# Patient Record
Sex: MALE
[De-identification: names, ages, dates, MRNs, and addresses within clinical notes are randomized per-mention and may not be internally consistent; named-entity substitution may affect disease eponyms.]

---

## 2021-10-04 ENCOUNTER — APPOINTMENT (OUTPATIENT)
Dept: INTERNAL MEDICINE | Facility: CLINIC | Age: 51
End: 2021-10-04

## 2021-12-18 ENCOUNTER — NON-APPOINTMENT (OUTPATIENT)
Age: 51
End: 2021-12-18

## 2022-08-08 ENCOUNTER — LABORATORY RESULT (OUTPATIENT)
Age: 52
End: 2022-08-08

## 2022-08-08 ENCOUNTER — NON-APPOINTMENT (OUTPATIENT)
Age: 52
End: 2022-08-08

## 2022-08-08 ENCOUNTER — APPOINTMENT (OUTPATIENT)
Dept: INTERNAL MEDICINE | Facility: CLINIC | Age: 52
End: 2022-08-08

## 2022-08-08 VITALS
TEMPERATURE: 97.8 F | SYSTOLIC BLOOD PRESSURE: 128 MMHG | WEIGHT: 149 LBS | RESPIRATION RATE: 16 BRPM | HEIGHT: 67 IN | BODY MASS INDEX: 23.39 KG/M2 | HEART RATE: 70 BPM | DIASTOLIC BLOOD PRESSURE: 70 MMHG | OXYGEN SATURATION: 98 %

## 2022-08-08 DIAGNOSIS — Z78.9 OTHER SPECIFIED HEALTH STATUS: ICD-10-CM

## 2022-08-08 DIAGNOSIS — N40.0 BENIGN PROSTATIC HYPERPLASIA WITHOUT LOWER URINARY TRACT SYMPMS: ICD-10-CM

## 2022-08-08 DIAGNOSIS — J34.2 DEVIATED NASAL SEPTUM: ICD-10-CM

## 2022-08-08 DIAGNOSIS — Z83.3 FAMILY HISTORY OF DIABETES MELLITUS: ICD-10-CM

## 2022-08-08 DIAGNOSIS — Z23 ENCOUNTER FOR IMMUNIZATION: ICD-10-CM

## 2022-08-08 DIAGNOSIS — J30.9 ALLERGIC RHINITIS, UNSPECIFIED: ICD-10-CM

## 2022-08-08 DIAGNOSIS — Z00.00 ENCOUNTER FOR GENERAL ADULT MEDICAL EXAMINATION W/OUT ABNORMAL FINDINGS: ICD-10-CM

## 2022-08-08 DIAGNOSIS — Z12.11 ENCOUNTER FOR SCREENING FOR MALIGNANT NEOPLASM OF COLON: ICD-10-CM

## 2022-08-08 LAB
DATE COLLECTED: NORMAL
HEMOCCULT SP1 STL QL: NEGATIVE

## 2022-08-08 PROCEDURE — 93000 ELECTROCARDIOGRAM COMPLETE: CPT

## 2022-08-08 PROCEDURE — 90715 TDAP VACCINE 7 YRS/> IM: CPT

## 2022-08-08 PROCEDURE — 99386 PREV VISIT NEW AGE 40-64: CPT | Mod: 25

## 2022-08-08 PROCEDURE — 90471 IMMUNIZATION ADMIN: CPT

## 2022-08-08 PROCEDURE — 82270 OCCULT BLOOD FECES: CPT

## 2022-08-08 NOTE — ASSESSMENT
[FreeTextEntry1] : health  He needs  eye dental exam psa , colon cancer screening   \par 2  bmi 24 continue to exercise  healthy eating  \par 3. prostate enlarge psa   he presently doesn’t have luts    he actual cause of prostate enlargement is unknown. Factors linked to aging and changes in the cells of the testicles may have a role in the growth of the gland. Men who have had their testicles removed at a young age (for example, as a result of testicular cancer) do not develop BPH.\par \par Also, if the testicles are removed after a man develops BPH, the prostate begins to shrink in size.\par \par Some facts about prostate enlargement:\par •The likelihood of developing an enlarged prostate increases with age.\par •BPH is so common that it has been said all men will have an enlarged prostate if they live long enough.\par •A small amount of prostate enlargement is present in many men over age 40. More than 90% of men over age 80 have the condition.\par •No risk factors have been identified other than having normally functioning testicles. \par \par \par \par \par \par \par \par Symptoms\par \par \par \par \par \par \par Less than half of all men with BPH have symptoms of the disease. Symptoms may include:\par •Dribbling at the end of urinating\par •Inability to urinate (urinary retention)\par •Incomplete emptying of your bladder\par •Incontinence\par •Needing to urinate 2 or more times per night\par •Pain with urination or bloody urine (these may indicate infection)\par •Slowed or delayed start of the urinary stream\par •Straining to urinate\par •Strong and sudden urge to urinate\par •Weak urine stream \par \par \par \par \par \par Exams and Tests\par \par \par \par \par \par \par Your health care provider will ask you questions about your medical history and do a digital rectal exam to feel the prostate gland. Other tests you may have include: \par •Urine flow rate\par •Post-void residual urine test to see how much urine is left in your bladder after you urinate\par •Pressure-flow studies to measure the pressure in the bladder as you urinate\par •Urinalysis to check for blood or infection\par •Urine culture to check for infection\par •Prostate-specific antigen (PSA) blood test to screen for prostate cancer\par •Cystoscopy\par \par You may be asked to fill out a form to rate how bad your symptoms are and how much they affect your daily life. Your doctor can use this score to  if your condition is getting worse over time.\par \par \par \par \par \par Treatment\par \par \par \par \par \par \par The treatment you choose will be based on how bad your symptoms are and how much they bother you. Your provider will also take into account other medical problems you may have.\par \par Treatment options include "watchful waiting," lifestyle changes, medicines, or surgery.\par \par If you are over 60, you are more likely to have symptoms. But many men with an enlarged prostate have only minor symptoms. Self-care steps are often enough to make you feel better.\par \par If you have BPH, you should have a yearly exam to monitor your symptoms and see if you need changes in treatment. \par \par SELF-CARE\par \par For mild symptoms:\par •Urinate when you first get the urge. Also, go to the bathroom on a timed schedule even if you don't feel a need to urinate.\par •Avoid alcohol and caffeine, especially after dinner.\par •DO NOT drink a lot of fluid all at once. Spread out fluids during the day. Avoid drinking fluids within 2 hours of bedtime.\par •Try NOT to take over-the-counter cold and sinus medicines that contain decongestants or antihistamines. These drugs can increase BPH symptoms.\par •Keep warm and exercise regularly. Cold weather and lack of physical activity may worsen symptoms.\par •Learn and perform Kegel exercises (pelvic strengthening exercises).\par •Reduce stress. Nervousness and tension can lead to more frequent urination. \par \par MEDICINES\par \par Alpha-1 blockers are a class of drugs that are also used to treat high blood pressure. These medicines relax the muscles of the bladder neck and prostate. This allows easier urination. Most people who take alpha-1 blockers notice improvement in their symptoms.\par \par Finasteride and dutasteride lower levels of hormones produced by the prostate. These drugs also reduce the size of the gland, increase urine flow rate, and decrease symptoms of BPH. You may need to take these medicines for 3 to 6 months before you notice symptoms getting better. Possible side effects include decreased sex drive and impotence.\par \par Antibiotics may be prescribed to treat chronic prostatitis (inflammation of the prostate), which may occur with BPH. BPH symptoms improve in some men after a course of antibiotics.\par \par Watch out for drugs that may make your symptoms worse:\par \par SAW PALMETTO\par \par Many herbs have been tried for treating an enlarged prostate. Many men use saw palmetto to ease symptoms. Some studies have shown that it may help with symptoms, but results are mixed and more research is needed. If you use saw palmetto and think it works, ask your doctor if you should still take it.\par \par SURGERY\par \par Prostate surgery may be recommended if you have:\par •Incontinence\par •Recurrent blood in the urine\par •Inability to fully empty the bladder (urinary retention)\par •Recurrent urinary tract infections\par •Decreasing kidney function\par •Bladder stones\par •Bothersome symptoms not responding to medicines \par \par The choice of which surgical procedure is recommended is most often based on the severity of your symptoms and the size and shape of your prostate gland. Most men who have prostate surgery have improvement in urine flow rates and symptoms.\par \par Transurethral resection of the prostate (TURP): This is the most common and most proven surgical treatment for BPH. TURP is performed by inserting a scope through the penis and removing the prostate piece by piece.\par \par Simple prostatectomy: It is a procedure to remove the inside part of the prostate gland. It is done through a surgical cut in your lower belly. This treatment is most often done on men who have very large prostate glands. \par \par Other, less-invasive procedures use heat to destroy prostate tissue. None have been proven to be better than TURP. People who receive these procedures are more likely to need surgery again after 5 or 10 years. However, these procedures may be a choice for:\par •Younger men (many of the less-invasive procedures carry a lower risk for impotence and incontinence than TURP, although the risk with TURP is not very high)\par •Older people\par •People with severe medical conditions, including uncontrolled diabetes, cirrhosis, alcoholism, psychosis, and serious lung, kidney, or heart disease\par •Men who are taking blood-thinning drugs \par \par \par \par \par \par Support Groups\par \par \par \par \par \par \par Some men may find it helpful to take part in a BPH support groups. \par \par \par \par \par \par Possible Complications\par \par \par \par \par \par \par Men who have had BPH for long time with slowly worsening symptom may develop:\par •Sudden inability to urinate\par •Urinary tract infections\par •Urinary stones\par •Damage to the kidneys\par •Blood in the urine \par \par BPH may come back over time even after having surgery. \par \par \par \par \par \par When to Contact a Medical Professional\par \par \par \par \par \par \par Call your provider right away if you have:\par •Less urine than usual\par •Fever or chills\par •Back, side, or abdominal pain\par •Blood or pus in your urine \par \par Also call if:\par •Your bladder does not feel completely empty after you urinate.\par •You take medicines that may cause urinary problems such as diuretics, antihistamines, antidepressants, or sedatives. DO NOT stop or change your medicines without talking to your doctor.\par •You have tried self-care steps for 2 months and symptoms have not improved. \par Alternative Names\par BPH; Benign prostatic hyperplasia (hypertrophy); Prostate - enlarged\par Patient Instructions\par •Enlarged prostate - what to ask your doctor \par •Prostate resection - minimally invasive - discharge\par •Transurethral resection of the prostate - discharge\par 4 colon cancer screening   WE discussed procedure and will return prior for blood testing and instruction.  Colon and rectal cancer screening is a way in which doctors check the colon and rectum for signs of cancer or growths (called polyps) that might become cancer. It is done in people who have no symptoms and no reason to think they have cancer. The goal is to find and remove polyps before they become cancer, or to find cancer early, before it grows, spreads, or causes problems.\par \par The colon and rectum are the last part of the digestive tract (figure 1). When doctors talk about colon and rectal cancer screening, they use the term "colorectal." That is just a shorter way of saying "colon and rectal." It's also possible to say just colon cancer screening.\par \par Studies show that having colon cancer screening lowers the chance of dying from colon cancer. There are several different types of screening test that can do this.\par \par What are the different screening tests for colon cancer? — They include:\par \par ?Colonoscopy – Colonoscopy allows the doctor to see directly inside the entire colon. Before you can have a colonoscopy, you must clean out your colon. You do this at home by drinking a special liquid that causes watery diarrhea for several hours. On the day of the test, you get medicine to help you relax. Then a doctor puts a thin tube into your anus and advances it into your colon (figure 2). The tube has a tiny camera attached to it, so the doctor can see inside your colon. The tube also has tiny tools on the end, so the doctor can remove pieces of tissue or polyps if they are there. After polyps or pieces of tissue are removed, they are sent to a lab to be checked for cancer.\par \par \par •Advantages of this test – Colonoscopy finds most small polyps and almost all large polyps and cancers. If found, polyps can be removed right away. This test gives the most accurate results. If any other screening tests are done first and come back positive, a colonoscopy will need to be done for follow-up. If you have a colonoscopy as your first test, you will probably not need a second follow-up test soon after.\par \par \par •Drawbacks to this test – Colonoscopy has some small risks. It can cause bleeding or tear the inside of the colon, but this only happens in 1 out of 1,000 people. Also, cleaning out the bowel beforehand can be unpleasant. Plus, people usually cannot work or drive for the rest of the day after the test, because of the relaxation medicine they must take during the test.\par \par \par Sigmoidoscopy – A sigmoidoscopy is similar to a colonoscopy. The difference is that this test looks only at the last part of the colon, and a colonoscopy looks at the whole colon. Before you have a sigmoidoscopy, you must clean out the lower part of your colon using an enema. This bowel cleaning is not as thorough or unpleasant as the one for colonoscopy. For this test, you do not need to take medicines to help you relax, so you can drive and work afterward if you want.\par \par \par •Advantages of this test – Sigmoidoscopy can find polyps and cancers in the rectum and the last part of the colon. If polyps are found, they can be removed right away.\par \par \par •Drawbacks to this test – In about 2 out of 10,000 people, sigmoidoscopy tears the inside of the colon. The test also can't find polyps or cancers that are in the part of the colon the test does not view (figure 3). If doctors find polyps or cancer during a sigmoidoscopy, they usually follow up with a colonoscopy.\par \par \par CT colonography (also known as virtual colonoscopy or CTC) – CTC looks for cancer and polyps using a special X-ray called a "CT scan." For most CTC tests, the preparation is the same as it is for colonoscopy.\par \par \par •Advantages of this test – CTC can find polyps and cancers in the whole colon without the need for medicines to relax.\par \par \par •Drawbacks to this test – If doctors find polyps or cancer with CTC, they usually follow up with a colonoscopy. CTC sometimes finds areas that look abnormal but that turn out to be healthy. This means that CTC can lead to tests and procedures you did not need. Plus, CTC exposes you to radiation. In most cases, the preparation needed to clean the bowel is the same as the one needed for a colonoscopy. The test is expensive, and some insurance companies might not cover this test for screening.\par \par \par Stool test for blood – "Stool" is another word for bowel movements. Stool tests most commonly check for blood in samples of stool. Cancers and polyps can bleed, and if they bleed around the time you do the stool test, then blood will show up on the test. The test can find even small amounts of blood that you can't see in your stool. Other less serious conditions can also cause small amounts of blood in the stool, and that will show up in this test. You will have to collect small samples from your bowel movements, which you will put in a special container you get from your doctor or nurse. Then you follow the instructions to mail the container out for the testing.\par \par \par •Advantages of this test – This test does not involve cleaning out the colon or having any procedures.\par \par \par •Drawbacks to this test – Stool tests are less likely to find polyps than other screening tests. These tests also often come up abnormal even in people who do not have cancer. If a stool test shows something abnormal, doctors usually follow up with a colonoscopy.\par \par \par Stool DNA test – The stool DNA test checks for genetic markers of cancer, as well as for signs of blood. For this test, you get a special kit in order to collect a whole bowel movement. Then you follow the instructions about how and where to ship it.\par \par \par •Advantages of this test – This test does not involve cleaning out the colon or having any procedures. When cancer is not present, it is less likely to be falsely abnormal than a stool test for blood. That means it leads to fewer unnecessary colonoscopies.\par \par \par •Drawbacks to this test – It might be unpleasant to collect and ship a whole bowel movement. If a DNA test shows something abnormal, doctors usually follow up with a colonoscopy.\par \par \par There is no blood test that most experts think is accurate enough to use for screening.\par \par How do I choose which test to have? — Work with your doctor or nurse to decide which test is best for you. Some doctors might choose to combine screening tests, for example, sigmoidoscopy plus stool testing for blood. Being screened–no matter how–is more important than which test you choose.\par \par Who should be screened for colon cancer? — Doctors recommend that most people begin having colon cancer screening at age 50. People who have an increased risk of getting colon cancer sometimes begin screening at a younger age. That might include people with a strong family history of colon cancer, and people with diseases of the colon called "Crohn's disease" and "ulcerative colitis."\par \par Most people can stop being screened around the age of 75, or at the latest 85.\par \par How often should I be screened? — That depends on your risk of colon cancer and which test you have. People who have a high risk of colon cancer often need to be tested more often and should have a colonoscopy.\par \par Most people are not at high risk, so they can choose one of these schedules:\par \par Colonoscopy every 10 years\par \par CT colonography (CTC) every 5 years\par \par Sigmoidoscopy every 5 to 10 years\par \par Stool testing for blood once a year\par \par Stool DNA testing every 3 years (but doctors are not yet sure of the best time frame)\par 5,  dtap vaccine needed  MEDICATION NAME: Boostrix 5-2.5-18.5 SUSP\par \par GENERIC NAME:  Diphtheria and Tetanus Toxoids, and Acellular Pertussis Vaccine (dif THEER ee a & TET a nus TOKS oyds, & ay ELSA caleb lar per TUS sis vak SEEN) \par \par COMMON USES:  It is used to prevent diphtheria, tetanus, and pertussis. \par \par HOW TO USE THIS MEDICINE:  HOW IS THIS DRUG BEST TAKEN? Use this drug as ordered by your doctor. Read all information given to you. Follow all instructions closely. It is given as a shot into a muscle. HOW DO I STORE AND/OR THROW OUT THIS DRUG? If you need to store this drug at home, talk with your doctor, nurse, or pharmacist about how to store it. WHAT DO I DO IF I MISS A DOSE? Call your doctor to find out what to do. \par \par CAUTIONS:  For all patients taking this drug: Tell all of your health care providers that you take this drug. This includes your doctors, nurses, pharmacists, and dentists. This drug may not protect all people who use it. Talk with the doctor. If you have a latex allergy, talk with your doctor. Some products have latex. Not all brands of vaccines are for children. Talk with your child's doctor. Tell your doctor if you are pregnant, plan on getting pregnant, or are breast-feeding. You will need to talk about the benefits and risks to you and the baby. Infants: If your child was born premature, talk with the doctor. Trouble breathing has happened in these children after getting some vaccines. \par \par POSSIBLE SIDE EFFECTS:  WHAT ARE SOME SIDE EFFECTS THAT I NEED TO CALL MY DOCTOR ABOUT RIGHT AWAY? WARNING/CAUTION: Even though it may be rare, some people may have very bad and sometimes deadly side effects when taking a drug. Tell your doctor or get medical help right away if you have any of the following signs or symptoms that may be related to a very bad side effect: Signs of an allergic reaction, like rash; hives; itching; red, swollen, blistered, or peeling skin with or without fever; wheezing; tightness in the chest or throat; trouble breathing, swallowing, or talking; unusual hoarseness; or swelling of the mouth, face, lips, tongue, or throat. Feeling confused. Very bad dizziness or passing out. Change in eyesight. Seizures. A burning, numbness, or tingling feeling that is not normal. Weakness. Trouble controlling body movements. High fever. WHAT ARE SOME OTHER SIDE EFFECTS OF THIS DRUG? All drugs may cause side effects. However, many people have no side effects or only have minor side effects. Call your doctor or get medical help if any of these side effects or any other side effects bother you or do not go away: For all patients taking this drug: Pain, redness, or swelling where the shot was given. Headache. Feeling tired or weak. Mild fever. Chills. Upset stomach or throwing up. Stomach pain or diarrhea. Joint pain or swelling. Swollen gland. Young children: Feeling fussy. Not hungry. Feeling sleepy. Crying that is not normal. These are not all of the side effects that may occur. If you have questions about side effects, call your doctor. Call your doctor for medical advice about side effects. Report side effects to the FDA/CDC Vaccine Adverse Event Reporting System (VAERS) at https://vaers.hhs.gov/reportevent.html or by calling 1-307.748.9109. \par \par BEFORE USING THIS MEDICINE:  WHAT DO I NEED TO TELL MY DOCTOR BEFORE I TAKE THIS DRUG? TELL YOUR DOCTOR: If you are allergic to this drug; any part of this drug; or any other drugs, foods, or substances. Tell your doctor about the allergy and what signs you had. TELL YOUR DOCTOR: If you have seizures or any other brain or nervous system problem. TELL YOUR DOCTOR: If you have had a brain problem like coma, lowered level of awareness, or seizures from an unknown cause within 7 days of a previous vaccine that has pertussis. This is not a list of all drugs or health problems that interact with this drug. Tell your doctor and pharmacist about all of your drugs (prescription or OTC, natural products, vitamins) and health problems. You must check to make sure that it is safe for you to take this drug with all of your drugs and health problems. Do not start, stop, or change the dose of any drug without checking with your doctor. \par \par OVERDOSE:  If you think there has been an overdose, call your poison control center or get medical care right away. Be ready to tell or show what was taken, how much, and when it happened. \par \par ADDITIONAL INFORMATION:  If your symptoms or health problems do not get better or if they become worse, call your doctor. Do not share your drugs with others and do not take anyone else's drugs. Keep all drugs in a safe place. Keep all drugs out of the reach of children and pets. Throw away unused or  drugs. Do not flush down a toilet or pour down a drain unless you are told to do so. Check with your pharmacist if you have questions about the best way to throw out drugs. There may be drug take-back programs in your area. Some drugs may have another patient information leaflet. Check with your pharmacist. If you have any questions about this drug, please talk with your doctor, nurse, pharmacist, or other health care provider. \par \par Copyright [employer] All Rights Reserved.     \par 6   allergies  Once a person's trigger(s) have been identified, the next step is to reduce exposure to those specific allergens. Triggers may be present at work or at home, although for most people, the home environment is the primary source. It is especially important to reduce exposure to triggers in the bedroom because most people spend a significant number of hours there. However, to be effective, changes must be made throughout the entire home Dust mites — Dust mites are a microscopic type of insect that live in bedding, sofas, carpets, or any woven material. Dust mites do not bite and do not cause harm to humans, other than by triggering allergies.\par Mites absorb humidity from the atmosphere (ie, they do not drink) and feed on organic matter (including shed human and animal skin). They require sufficient humidity and nests to live in (which are not visible with the naked eye). Dust mite infestation is less common in dry climates, such as the [place].\par Exposure to dust mites can be reduced by encasing pillows, mattresses, box springs, comforters, and furniture in mite-impermeable barriers. When covering crib mattresses and children's mattresses and pillows, only tight-fitting, commercial covers intended for this purpose should be used. Homemade covers (for example, plastic sheeting fastened with duct tape) should not be used in children's beds, as these can come apart, and children can become trapped or suffocate. Tightly-woven fabrics with a pore size of 6 microns or less are very effective at controlling the passage of mite as well as cat allergens. Fabrics with a pore size greater than 2 microns still permit airflow Mites can be eliminated by washing sheets and blankets weekly in warm water with detergent or by drying them in an electric dryer on the hot setting Exposure can be further reduced by vacuuming with a vacuum  equipped with a high-efficiency particulate air (HEPA) filter, dusting regularly, and not sleeping on upholstered furniture (eg, couches). However, studies have yet to show that physical or chemical cleaning methods reduce mite levels to a degree that improves symptoms Indoor humidity levels should be kept between 30 and 50 percent. Inexpensive humidity monitors can be purchased at most [email or web address] stores. Humidifiers make the problem worse and are not recommended.\par When possible, the amount of clutter, carpet, upholstered furniture, and drapes should be minimized, and horizontal blinds should be eliminated in the rooms where the person spends the most time (bedroom, study, television room). Washable vinyl, roller-type shades are optimal. For children, the number of stuffed toys in the bedroom should be minimized.\par Animal dander — Animal dander is made up of the dead skin cells or scales (like dandruff) that are constantly shed by animals. Any breed of dog or cat is capable of being allergenic, although the levels given off by individual animals may vary to some degree. In cats, the protein that causes most people's allergies is found in the cat's saliva, skin glands, and urinary/reproductive tract. Accordingly, short-haired cats are not necessarily less allergenic than long-haired animals, and furless cats have allergen levels similar to furred cats.\par Other animals, such as rodents, birds, and ferrets, can also trigger symptoms in an allergic individual. Pets without feathers or fur, such as reptiles, turtles, and fish, rarely cause allergy, although deposits of fish food that build up under the covers of fish tanks are an excellent source of food for dust mite colonies.\par If a person is found to be allergic to a pet, the most effective option is to remove the pet from the home. Limiting an animal to a certain area in the house is not effective, because allergens are carried on clothing or spread in the air. Once a pet has left a home, careful cleaning (or removal) of carpets, sofas, curtains, and bedding must follow. This is particularly true for cat allergens because they are "sticky" and adhere to a variety of indoor surfaces. Even after a cat has been removed from a home and it has been thoroughly cleaned, it can take months for the level of cat allergen to drop. For this reason, it may take months for the person's symptoms to fully reflect the absence of the pet.\par If it is not possible to remove the animal, measures can be taken to decrease exposure to the animal dander (although none of these methods are as effective as removing the animal. Vacuum  with a HEPA filter are effective in reducing cat and dog allergen levels in the home and can reduce symptoms Rodents — Mice and rats have proteins in their urine that can cause allergies. This applies to rodents that live in a laboratory setting, as well as rodents that live in the wild.\par To reduce rodent allergen levels significantly, a combination of pest control methods, in addition to pesticides (eg, poison baits), are usually necessary. This includes keeping food and trash in covered containers, cleaning food scraps from the floor and countertops, and sealing cracks in the walls, doors, and floors.\par Cockroaches — Cockroach droppings contain allergens that can trigger asthma and allergic rhinitis in sensitive individuals. Cockroaches thrive in warm, moist environments with easily accessible food and water. Unfortunately, efforts to control cockroach populations in infested areas are often less than successful. Still, certain measures are recommended:\par ?Use multiple baited traps or poisons\par ?Remove garbage and food waste promptly from the home\par ?Wash dishes and cooking utensils immediately after use\par ?Remove cockroach debris quickly\par ?Eliminate any standing water from leaking faucets or drains\par ?Keep humidity levels less than 50 percent with a dehumidifier or air conditioner\par ?Consult a professional  for large or recurrent infestations\par  ladybugs — Asian ladybugs were previously imported to the [place] as a biologic means of controlling aphids. It was anticipated that the insects would not survive the cold of winter. However, they adapted by moving inside houses when temperatures drop in the early fall. Allergies to Asian ladybugs have been increasingly reported as a source of seasonal indoor respiratory symptoms, particularly chronic cough, rhinitis, and asthma. Most cases have been reported in [place] areas [place] the [place]. The insects can also bite and cause local reactions.\par  ladybugs enter homes through external cracks and crevices and then infest spaces within walls. They secrete a brown liquid that may stain walls and produce an unpleasant smell.\par Treating the exterior of the house with a chemical (pyrethroids) before cold weather arrives can prevent swarming of the ladybugs inside the home. Pyrethroids are similar to pyrethrins, which are derived from marigold flowers. Pyrethrins are broken down by the sun and do not significantly affect groundwater quality.\par Indoor molds — Mold spores can trigger symptoms of allergic rhinitis in allergic patients. Mold thrives in damp environments. Areas, such as air conditioning vents, water traps, refrigerator drip trays, shower stalls, leaky sinks, and damp basements, are particularly vulnerable to mold growth if not cleaned regularly. Most of the mold spores enter the home from the outside air. However, under certain circums\par

## 2022-08-08 NOTE — COUNSELING
[Fall prevention counseling provided] : Fall prevention counseling provided [Adequate lighting] : Adequate lighting [No throw rugs] : No throw rugs [Use proper foot wear] : Use proper foot wear [Use recommended devices] : Use recommended devices [Sleep ___ hours/day] : Sleep [unfilled] hours/day [Engage in a relaxing activity] : Engage in a relaxing activity [Plan in advance] : Plan in advance [____ min/wk Activity] : [unfilled] min/wk activity [FreeTextEntry2] : 139- 159  he is  89  bmi 23

## 2022-08-08 NOTE — PHYSICAL EXAM
[Well Developed] : well developed [Well Nourished] : well nourished [Normal Voice Quality] : was normal [Normal Verbal Skills] : the patient had normal verbal communication skills [Normal Nonverbal Skills] : normal nonverbal communication skills were demonstrated [Conjunctiva] : the conjunctiva were normal in both eyes [PERRL] : pupils were equal in size, round, and reactive to light [EOM Intact] : extraocular movements were intact [Normal Outer Ear/Nose] : the outer ears and nose were normal in appearance [Normal Oropharynx] : the oropharynx was normal [Normal TMs] : both tympanic membranes were normal [Normal Appearance] : was normal in appearance [Neck Supple] : was supple [Rate ___] : at [unfilled] breaths per minute [Normal Rhythm/Effort] : normal respiratory rhythm and effort [Clear Bilaterally] : the lungs were clear to auscultation bilaterally [Normal to Percussion] : the lungs were normal to percussion [5th Left ICS - MCL] : palpated at the 5th LICS in the midclavicular line [Heart Rate ___] : [unfilled] bpm [Rhythm Regular] : regular [Normal Rate] : normal [Normal S1] : normal S1 [Normal S2] : normal S2 [No Murmur] : no murmurs heard [No Pitting Edema] : no pitting edema present [2+] : left 2+ [No Abnormalities] : the abdominal aorta was not enlarged and no bruit was heard [Examination Of The Breasts] : a normal appearance [No Discharge] : no discharge [Flat] : flat [Soft, Nontender] : the abdomen was soft and nontender [No Mass] : no masses were palpated [No HSM] : no hepatosplenomegaly noted [Normal rectal exam] : was normal [Stool Sample Taken] : a stool sample was obtained [Urethral Meatus] : meatus normal [Penis Abnormality] : normal circumcised penis [Urinary Bladder Findings] : the bladder was normal on palpation [Testes Tenderness] : no tenderness of the testes [Testes Mass (___cm)] : there were no testicular masses [Prostate Enlarged] : was enlarged [No Lymphangitis] : no lymphangitis observed [Normal Kyphosis] : normal kyphosis [No Visual Abnormalities] : no visible abnormalities [Normal Lordosis] : normal lordosis [No Scoliosis] : no scoliosis [No Tenderness to Palpation] : no spine tenderness on palpation [No Masses] : no masses [Full ROM] : full ROM [No Pain with ROM] : no pain with motion in any direction [Intact] : all reflexes within normal limits bilaterally [Normal Station and Gait] : the gait and station were normal [Normal Motor Tone] : the muscle tone was normal [Involuntary Movements] : no involuntary movements were seen [Normal Scalp] : inspection of the scalp showed no abnormalities [Examination Of The Hair] : texture and distribution of hair was normal [Complexion Medium] : medium complexion [Normal] : the deep tendon reflexes were normal [Normal Mental Status] : the patient's orientation, memory, attention, language and fund of knowledge were normal [Appropriate] : appropriate [Enlarged Diffusely] : was not enlarged [JVP Elevated ___cm] : the JVP was not elevated [S3] : no S3 [S4] : no S4 [Rt] : no varicose veins of the right leg [Lt] : no varicose veins of the left leg [Right Carotid Bruit] : no bruit heard over the right carotid [Left Carotid Bruit] : no bruit heard over the left carotid [Right Femoral Bruit] : no bruit heard over the right femoral artery [Left Femoral Bruit] : no bruit heard over the left femoral artery [Bruit] : no bruit heard [Internal Hemorrhoid] : no internal hemorrhoids were present [External Hemorrhoid] : no external hemorrhoids were present [Tender, Swollen] : that was nontender and non-swollen [Occult Blood Positive] : was negative for occult blood [Gross Blood] : no gross blood [Fecal Impaction] : no fecal impaction was present [Postauricular Lymph Nodes Enlarged Bilaterally] : nodes not enlarged [Preauricular Lymph Nodes Enlarged Bilaterally] : nodes not enlarged [Submandibular Lymph Nodes Enlarged Bilaterally] : nodes not enlarged [Suboccipital Lymph Nodes Enlarged Bilaterally] : nodes not enlarged [Submental Lymph Nodes Enlarged] : nodes not enlarged [Cervical Lymph Nodes Enlarged Posterior Bilaterally] : nodes not enlarged [Cervical Lymph Nodes Enlarged Anterior Bilaterally] : nodes not enlarged [Supraclavicular Lymph Nodes Enlarged Bilaterally] : nodes not enlarged [Axillary Lymph Nodes Enlarged Bilaterally] : nodes not enlarged [Epitrochlear Lymph Nodes Enlarged Bilaterally] : nodes not enlarged [Femoral Lymph Nodes Enlarged Bilaterally] : nodes not enlarged [Inguinal Lymph Nodes Enlarged Bilaterally] : nodes not enlarged [Abnormal Color] : normal color and pigmentation [Skin Lesions 1] : no skin lesions were observed [Tattoo - Single] : no tattoos observed [Skin Turgor Decreased] : normal skin turgor [Impaired judgment] : intact judgment [Impaired Insight] : intact insight [de-identified] : deviated septum to right and erythema of nasal mucosa

## 2022-08-08 NOTE — HISTORY OF PRESENT ILLNESS
[FreeTextEntry1] : new pt  [de-identified] : Pt is a 51 yr old man who came to establish medical care.  He -denies any headaches, nausea, vomiting, fever, chills, sweats, chest pain, chest pressure, diarrhea, constipation, dysphagia, sour taste in the mouth, dizziness, leg swelling, leg pain, myalgias, arthralgias, itchy eyes, itchy ears, heartburn, or reflux.\par \par \par

## 2022-08-08 NOTE — HEALTH RISK ASSESSMENT
[Very Good] : ~his/her~  mood as very good [Never] : Never [No] : In the past 12 months have you used drugs other than those required for medical reasons? No [No falls in past year] : Patient reported no falls in the past year [0] : 2) Feeling down, depressed, or hopeless: Not at all (0) [PHQ-2 Negative - No further assessment needed] : PHQ-2 Negative - No further assessment needed [HIV test declined] : HIV test declined [Hepatitis C test offered] : Hepatitis C test offered [None] : None [With Family] : lives with family [# of Members in Household ___] :  household currently consist of [unfilled] member(s) [Employed] : employed [College] : College [] :  [# Of Children ___] : has [unfilled] children [Sexually Active] : sexually active [Feels Safe at Home] : Feels safe at home [Fully functional (bathing, dressing, toileting, transferring, walking, feeding)] : Fully functional (bathing, dressing, toileting, transferring, walking, feeding) [Fully functional (using the telephone, shopping, preparing meals, housekeeping, doing laundry, using] : Fully functional and needs no help or supervision to perform IADLs (using the telephone, shopping, preparing meals, housekeeping, doing laundry, using transportation, managing medications and managing finances) [Smoke Detector] : smoke detector [Carbon Monoxide Detector] : carbon monoxide detector [Safety elements used in home] : safety elements used in home [Seat Belt] :  uses seat belt [Sunscreen] : uses sunscreen [FreeTextEntry1] : none  [de-identified] : regular exercise  [de-identified] : regular  doesn’t heat pork  [[id number]] : 0 [Change in mental status noted] : No change in mental status noted [Language] : denies difficulty with language [Behavior] : denies difficulty with behavior [Learning/Retaining New Information] : denies difficulty learning/retaining new information [Handling Complex Tasks] : denies difficulty handling complex tasks [Reasoning] : denies difficulty with reasoning [Spatial Ability and Orientation] : denies difficulty with spatial ability and orientation [Reports changes in hearing] : Reports no changes in hearing [Reports changes in vision] : Reports no changes in vision [Reports changes in dental health] : Reports no changes in dental health [Guns at Home] : no guns at home [Travel to Developing Areas] : does not  travel to developing areas [TB Exposure] : is not being exposed to tuberculosis [Caregiver Concerns] : does not have caregiver concerns [ColonoscopyDate] : never had [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  [de-identified] : last eye exam  2yrs ago wears glasses for driving and reading  [de-identified] : last dental  5 yrs ago  [AdvancecareDate] : 08/22

## 2022-08-09 LAB
25(OH)D3 SERPL-MCNC: 35.2 NG/ML
ALBUMIN SERPL ELPH-MCNC: 4.4 G/DL
ALP BLD-CCNC: 71 U/L
ALT SERPL-CCNC: 20 U/L
ANION GAP SERPL CALC-SCNC: 18 MMOL/L
APPEARANCE: CLEAR
AST SERPL-CCNC: 27 U/L
BACTERIA: NEGATIVE
BASOPHILS # BLD AUTO: 0.07 K/UL
BASOPHILS NFR BLD AUTO: 1.2 %
BILIRUB SERPL-MCNC: 0.4 MG/DL
BILIRUBIN URINE: NEGATIVE
BLOOD URINE: NEGATIVE
BUN SERPL-MCNC: 17 MG/DL
CALCIUM SERPL-MCNC: 9.6 MG/DL
CHLORIDE SERPL-SCNC: 102 MMOL/L
CHOLEST SERPL-MCNC: 200 MG/DL
CO2 SERPL-SCNC: 22 MMOL/L
COLOR: COLORLESS
CREAT SERPL-MCNC: 1.07 MG/DL
EGFR: 85 ML/MIN/1.73M2
EOSINOPHIL # BLD AUTO: 0.16 K/UL
EOSINOPHIL NFR BLD AUTO: 2.8 %
GLUCOSE QUALITATIVE U: NEGATIVE
GLUCOSE SERPL-MCNC: 55 MG/DL
HBV CORE IGG+IGM SER QL: NONREACTIVE
HBV SURFACE AB SER QL: NONREACTIVE
HBV SURFACE AG SER QL: NONREACTIVE
HCT VFR BLD CALC: 46.6 %
HCV AB SER QL: NONREACTIVE
HCV S/CO RATIO: 0.11 S/CO
HDLC SERPL-MCNC: 59 MG/DL
HEPATITIS A IGG ANTIBODY: REACTIVE
HGB BLD-MCNC: 15.1 G/DL
HYALINE CASTS: 0 /LPF
IMM GRANULOCYTES NFR BLD AUTO: 0.2 %
KETONES URINE: NEGATIVE
LDLC SERPL CALC-MCNC: 126 MG/DL
LEUKOCYTE ESTERASE URINE: NEGATIVE
LYMPHOCYTES # BLD AUTO: 2 K/UL
LYMPHOCYTES NFR BLD AUTO: 34.7 %
MAN DIFF?: NORMAL
MCHC RBC-ENTMCNC: 29.5 PG
MCHC RBC-ENTMCNC: 32.4 GM/DL
MCV RBC AUTO: 91.2 FL
MICROSCOPIC-UA: NORMAL
MONOCYTES # BLD AUTO: 0.48 K/UL
MONOCYTES NFR BLD AUTO: 8.3 %
NEUTROPHILS # BLD AUTO: 3.04 K/UL
NEUTROPHILS NFR BLD AUTO: 52.8 %
NITRITE URINE: NEGATIVE
NONHDLC SERPL-MCNC: 141 MG/DL
PH URINE: 7
PLATELET # BLD AUTO: 183 K/UL
POTASSIUM SERPL-SCNC: 4.4 MMOL/L
PROT SERPL-MCNC: 7.3 G/DL
PROTEIN URINE: NEGATIVE
PSA SERPL-MCNC: 0.63 NG/ML
RBC # BLD: 5.11 M/UL
RBC # FLD: 12.4 %
RED BLOOD CELLS URINE: 0 /HPF
SODIUM SERPL-SCNC: 141 MMOL/L
SPECIFIC GRAVITY URINE: 1
SQUAMOUS EPITHELIAL CELLS: 0 /HPF
T PALLIDUM AB SER QL IA: NEGATIVE
TRIGL SERPL-MCNC: 74 MG/DL
UROBILINOGEN URINE: NORMAL
WBC # FLD AUTO: 5.76 K/UL
WHITE BLOOD CELLS URINE: 0 /HPF

## 2022-08-11 LAB

## 2023-02-21 ENCOUNTER — APPOINTMENT (OUTPATIENT)
Dept: INTERNAL MEDICINE | Facility: CLINIC | Age: 53
End: 2023-02-21